# Patient Record
Sex: MALE | Race: WHITE | Employment: FULL TIME | URBAN - METROPOLITAN AREA
[De-identification: names, ages, dates, MRNs, and addresses within clinical notes are randomized per-mention and may not be internally consistent; named-entity substitution may affect disease eponyms.]

---

## 2023-11-08 ENCOUNTER — HOSPITAL ENCOUNTER (OUTPATIENT)
Dept: GENERAL RADIOLOGY | Age: 43
Discharge: HOME | End: 2023-11-08
Attending: PHYSICIAN ASSISTANT
Payer: COMMERCIAL

## 2023-11-08 DIAGNOSIS — M47.22 CERVICAL SPONDYLOSIS WITH RADICULOPATHY: ICD-10-CM

## 2023-11-08 PROCEDURE — 72050 X-RAY EXAM NECK SPINE 4/5VWS: CPT | Performed by: PHYSICIAN ASSISTANT

## 2023-11-13 ENCOUNTER — TELEPHONE (OUTPATIENT)
Dept: SURGERY | Facility: CLINIC | Age: 43
End: 2023-11-13

## 2023-11-13 ENCOUNTER — OFFICE VISIT (OUTPATIENT)
Dept: SURGERY | Facility: CLINIC | Age: 43
End: 2023-11-13
Payer: COMMERCIAL

## 2023-11-13 VITALS
BODY MASS INDEX: 25.73 KG/M2 | SYSTOLIC BLOOD PRESSURE: 120 MMHG | WEIGHT: 190 LBS | HEIGHT: 72 IN | DIASTOLIC BLOOD PRESSURE: 72 MMHG

## 2023-11-13 DIAGNOSIS — M54.2 NECK PAIN: Primary | ICD-10-CM

## 2023-11-13 DIAGNOSIS — R29.898 ARM WEAKNESS: ICD-10-CM

## 2023-11-13 DIAGNOSIS — M50.20 CERVICAL DISC HERNIATION: ICD-10-CM

## 2023-11-13 DIAGNOSIS — R20.0 NUMBNESS AND TINGLING OF LEFT HAND: ICD-10-CM

## 2023-11-13 DIAGNOSIS — M48.02 CERVICAL STENOSIS OF SPINAL CANAL: ICD-10-CM

## 2023-11-13 DIAGNOSIS — M54.12 CERVICAL RADICULOPATHY: ICD-10-CM

## 2023-11-13 DIAGNOSIS — M48.02 NEURAL FORAMINAL STENOSIS OF CERVICAL SPINE: ICD-10-CM

## 2023-11-13 DIAGNOSIS — R20.2 NUMBNESS AND TINGLING OF LEFT HAND: ICD-10-CM

## 2023-11-13 DIAGNOSIS — M50.30 DDD (DEGENERATIVE DISC DISEASE), CERVICAL: ICD-10-CM

## 2023-11-13 DIAGNOSIS — M47.812 FACET ARTHROPATHY, CERVICAL: ICD-10-CM

## 2023-11-13 NOTE — PROGRESS NOTES
Pt here for neck pain that travels down to both arms- more on the right. Pt states he has numbness in right hand and tingling in left arm. Pt did do PT- but did not help. No injections. No neck or back surgery.  Pt [facility].

## 2023-11-13 NOTE — TELEPHONE ENCOUNTER
Pt brought in X-ray Thoracis, X-ray Shoulder and X-ray cervical from [facility] dated 10/18/23 and MRI Cervical Spine from [facility] Dated 11/1/23. Disc was uploaded and returned to pt at time of visit. No imaging reports.

## 2023-11-13 NOTE — TELEPHONE ENCOUNTER
Patient is scheduled for ANTERIOR CERVICAL 5 TO 6, CERVICAL 6 TO 7 DISCECTOMY AND FUSION  on 1-8-24 with Dr Bo Caba at [facility].    Y pre-op apt scheduled (if sx is more than 30 days from last apt)  Ypre-op apt scheduled with RN spine navigator  Y Surgical instructions reviewed by nursing staff with patient  Y  form completed  Y Surgery order signed   Phoebe Lowe on surgery sheet  Y Placed on outlook calendar  Y [employer] message sent to patient with sx instructions  Y Faxed pre-op clearance request to PCP Dr Mary Jo Mckoy letter to prescribing provider requesting anticoagulants be held for surgery  N/a E-mail sent to [employer]  Y Post-op appointments made  Y PA [employer]. Routed to PA team to initiate. Y Post-Op outreach pt reminder placed.    Y Entire Neurosurgery Checklist Completed

## 2023-11-13 NOTE — PATIENT INSTRUCTIONS
You are scheduled for anterior cervical discectomy and fusion on 1-8-24 with Dr. Smita Baez at [facility] AND [facility].    Pre-op clearance from your primary care provider is needed within 30 days of surgery. We have faxed a clearance request to   's office. Please contact their office for appointment. Please schedule this appointment AT LEAST 1 WEEK PRIOR TO SURGERY DATE. Your PCP may order additional testing or clearance from another specialist.     Precious Sigala will have an appointment with our RN Spine Navigator prior to surgery. This is an educational telehealth/phone visit in which you will receive more information on the specifics of your surgery, instructions for before surgery, what to expect in the hospital and how to take care of yourself after surgery. Your surgeon wants you to to participate in this visit so that you are as prepared for surgery as possible and have an opportunity to ask questions. If possible, we would like your care partner to be present for the visit as well. You will need to contact the Pre-admission department at [phone] to schedule your pre-op testing. They will get you scheduled for blood work and a MRSA test (nasal swab). You will need for fast for the blood work. You may also need an EKG and/or chest x-ray depending on your current health status. If they do not answer when you call, please leave a message and they will call you back. They return calls in order of the date of surgery so it may be a few days before they return your call. You should have nothing to eat or drink after 12:00 am the night prior to surgery EXCEPT GATORADE:  You will need to drink 12 ounces (small bottle) of regular Gatorade (NOT RED) 12 hours and 4 hours prior to your scheduled surgery time. Do not drink any other liquids (including water) before your surgery. Do not chew gum or eat candies before surgery.   Take 1000 mg of Tylenol (Acetaminophen) 4 hours before your scheduled surgery time, take this with your scheduled Gatorade. You should take your daily medications with the 4 hour Gatorade, unless instructed otherwise. Surgery is usually scheduled as 1-2 day admission. This is an estimate and varies from person to person. Ultimately, the surgeon will determine when you are ready to be discharged. Our office will contact your insurance for authorization of surgery. The hospital will contact you 1-2 days before surgery with your expected arrival time and day-of instructions. To prevent infection post-operatively, you will need to shower with Hibiclens soap for 5 days prior to surgery. The last shower should be the night before surgery. Hibiclens soap can be found at any pharmacy in the first-aid section. See detailed instructions at the end of this message. FMLA/DISABILITY PAPERWORK  If you require FMLA or disability paperwork for your recovery, please make sure to either drop it off or have it faxed to our office at . Be sure the form has your name and date of birth on it. The form will be faxed to our Forms Department and they will complete it for you. There is a 25$ fee for all forms that need to be filled out. Please be aware there is a 10-14 day turnaround time. You will need to sign a release of information (KASHIF) form if your paperwork does not come with one. You may call the Forms Department with any questions at [phone]. Their fax number is [phone]. Hibiclens Bathing  Hibiclens is a body soap that is used before surgery protect you from getting an infection after surgery   Hibiclens can be found in most pharmacies in the [address] with this daily for FIVE consecutive days before surgery, using the entire bottle over the five days. The last shower should be the night before surgery.    Steps to bathing with Hibiclens  Do not use Hibiclens on your hair, face or private areas  Wash your hair and face as normal with your usual cleansers  Rinse well  Using a clean wet washcloth apply enough Hibiclens to cover your body. Wash from the neck down avoiding the genital areas and concentrating on the surgical area  Rinse well  Dry yourself with a clean, dry towel  Do not use any powders, creams, lotions or sprays on your body as these attract bacteria  Deodorant and facial creams are acceptable. (Laundering/cleaning: Chlorhexidine gluconate skin cleansers will cause stains if used with chlorine releasing products. Rinse completely and use only non-chlorine detergents.)    POST OP CARE--First Two Weeks  No bending at waist, twisting, pushing or pulling  No lifting more than 10 lb (a gallon of milk)  Wear cervical collar/lumbar brace, if prescribed, as instructed by surgeon  No overhead reaching  For anterior cervical surgeries, begin with eating soft foods and thick liquids for the first few days. It is normal to have some discomfort when swallowing. Return to normal diet as tolerated. No driving until approved by your surgeon   Change positions frequently. No prolonged sitting or standing. Increase walking as tolerated to avoid blood clots  No NSAIDs until approved by surgeon (ibuprofen, aleve, advil, meloxicam, Celebrex, diclofenac etc). Remove any bandage on post op day 3. Leave incision open to air. Glue will fall off on its own. If you have staples or sutures that are not dissolvable, these will be removed at your 2 week post op visit. Check your incision for signs of infection daily (redness, warmth, drainage, increased pain at incision site, fever)  Do not shower until post op day 3. Do not allow water pressure to directly hit the incision. Do not scrub the incision and avoid any lotion, creams or perfume near the incision site. No swimming, hot tubs or baths until your incision is completely healed  Take pain medication as prescribed, if needed. Constipation is a common side effect of opioids.   If you experience constipation, drink plenty of water and take over-the-counter stool softeners daily.    Avoid nicotine and alcohol   When to call the office:  Increased or change in location of pain  Increased weakness in arms or legs  Incision drainage, redness or warmth  Fever  Bowel or bladder changes   Choking on food or liquids (cervical)  Pain, redness, swelling, color changes or warmth in lower leg

## 2023-11-13 NOTE — TELEPHONE ENCOUNTER
You are scheduled for ANTERIOR CERVICAL 5 TO 6, CERVICAL 6 TO 7 DISCECTOMY AND FUSION  on 1-8-24 with Dr. Ivan Rushing at [facility].    Pre-op clearance from your primary care provider is needed within 30 days of surgery. We have faxed a clearance request to Dr. Jenny Cross 's office. Please contact their office for appointment. Please schedule this appointment AT LEAST 1 WEEK PRIOR TO SURGERY DATE. Your PCP may order additional testing or clearance from another specialist.     Vale Thomas will have an appointment with our RN Spine Navigator prior to surgery. This is an educational telehealth/phone visit in which you will receive more information on the specifics of your surgery, instructions for before surgery, what to expect in the hospital and how to take care of yourself after surgery. Your surgeon wants you to to participate in this visit so that you are as prepared for surgery as possible and have an opportunity to ask questions. If possible, we would like your care partner to be present for the visit as well. You will need to contact the Pre-admission department at [phone] to schedule your pre-op testing. They will get you scheduled for blood work and a MRSA test (nasal swab). You will need for fast for the blood work. You may also need an EKG and/or chest x-ray depending on your current health status. If they do not answer when you call, please leave a message and they will call you back. They return calls in order of the date of surgery so it may be a few days before they return your call. You should have nothing to eat or drink after 12:00 am the night prior to surgery EXCEPT GATORADE:  You will need to drink 12 ounces (small bottle) of regular Gatorade (NOT RED) 12 hours and 4 hours prior to your scheduled surgery time. Do not drink any other liquids (including water) before your surgery. Do not chew gum or eat candies before surgery.   Take 1000 mg of Tylenol (Acetaminophen) 4 hours before your scheduled surgery time, take this with your scheduled Gatorade. You should take your daily medications with the 4 hour Gatorade, unless instructed otherwise. Surgery is usually scheduled as 1-2 day admission. This is an estimate and varies from person to person. Ultimately, the surgeon will determine when you are ready to be discharged. Our office will contact your insurance for authorization of surgery. The hospital will contact you 1-2 days before surgery with your expected arrival time and day-of instructions. To prevent infection post-operatively, you will need to shower with Hibiclens soap for 5 days prior to surgery. The last shower should be the night before surgery. Hibiclens soap can be found at any pharmacy in the first-aid section. See detailed instructions at the end of this message. FMLA/DISABILITY PAPERWORK  If you require FMLA or disability paperwork for your recovery, please make sure to either drop it off or have it faxed to our office at . Be sure the form has your name and date of birth on it. The form will be faxed to our Forms Department and they will complete it for you. There is a 25$ fee for all forms that need to be filled out. Please be aware there is a 10-14 day turnaround time. You will need to sign a release of information (KASHIF) form if your paperwork does not come with one. You may call the Forms Department with any questions at [phone]. Their fax number is [phone]. Hibiclens Bathing  Hibiclens is a body soap that is used before surgery protect you from getting an infection after surgery   Hibiclens can be found in most pharmacies in the [address] with this daily for FIVE consecutive days before surgery, using the entire bottle over the five days. The last shower should be the night before surgery.    Steps to bathing with Hibiclens  Do not use Hibiclens on your hair, face or private areas  Wash your hair and face as normal with your usual cleansers  Rinse well  Using a clean wet washcloth apply enough Hibiclens to cover your body. Wash from the neck down avoiding the genital areas and concentrating on the surgical area  Rinse well  Dry yourself with a clean, dry towel  Do not use any powders, creams, lotions or sprays on your body as these attract bacteria  Deodorant and facial creams are acceptable. (Laundering/cleaning: Chlorhexidine gluconate skin cleansers will cause stains if used with chlorine releasing products. Rinse completely and use only non-chlorine detergents.)    POST OP CARE--First Two Weeks  No bending at waist, twisting, pushing or pulling  No lifting more than 10 lb (a gallon of milk)  Wear cervical collar/lumbar brace, if prescribed, as instructed by surgeon  No overhead reaching  For anterior cervical surgeries, begin with eating soft foods and thick liquids for the first few days. It is normal to have some discomfort when swallowing. Return to normal diet as tolerated. No driving until approved by your surgeon   Change positions frequently. No prolonged sitting or standing. Increase walking as tolerated to avoid blood clots  No NSAIDs until approved by surgeon (ibuprofen, aleve, advil, meloxicam, Celebrex, diclofenac etc). Remove any bandage on post op day 3. Leave incision open to air. Glue will fall off on its own. If you have staples or sutures that are not dissolvable, these will be removed at your 2 week post op visit. Check your incision for signs of infection daily (redness, warmth, drainage, increased pain at incision site, fever)  Do not shower until post op day 3. Do not allow water pressure to directly hit the incision. Do not scrub the incision and avoid any lotion, creams or perfume near the incision site. No swimming, hot tubs or baths until your incision is completely healed  Take pain medication as prescribed, if needed.   Constipation is a common side effect of opioids. If you experience constipation, drink plenty of water and take over-the-counter stool softeners daily.    Avoid nicotine and alcohol   When to call the office:  Increased or change in location of pain  Increased weakness in arms or legs  Incision drainage, redness or warmth  Fever  Bowel or bladder changes   Choking on food or liquids (cervical)  Pain, redness, swelling, color changes or warmth in lower leg    CLEARANCES-PCP  PA-[employer]BS PPO  CPT [id number], [place], [address], [id number], [facility], [id number]

## 2023-11-13 NOTE — PROGRESS NOTES
New Neurosurgery Patient    Patient: Tangela Rao  Medical Record Number: [id number]  YOB: 1980  PCP: No primary care provider on file. Referring Provider: No ref. provider found. Reason for visit: neck and arm pain     HISTORY OF PRESENTING ILLNESS:  I had the pleasure of meeting Tangela Rao in the neurosurgery clinic today. Tangela Rao is a pleasant 44year old male who presents today with complaints of neck and bilateral upper extremity symptoms. He reports that he began participating in [employer] at the beginning of this year. He states that he went on vacation shortly after starting and subsequently developed left upper extremity pain. He denies any particular traumas or inciting events, as the pain was not acute in onset. In July 2023, he began to notice left upper extremity weakness. Today, he endorses neck pain that radiates into his right trapezius muscle and extends into his posterolateral arm and proximal forearm. The pain is constant in nature and can reach a 10/10 in severity at its worst.  When manageable, it can be a 2/10, but is still bothersome to the patient. He feels a \"pulling sensation\" in his neck/trapezius muscle on the right. His neck pain is equal in severity to his upper extremity pain. He has tried OTC pain control, heat, physical therapy, and marijuana use without good relief. He has not had cervical injections. He describes associated numbness/tingling along this distribution in the right upper extremity. He also endorses right upper extremity weakness. He denies any pain in his left upper extremity, but does notice numbness/tingling in his first through third digits. His right hand is spared. He does not have any difficulty with manual dexterity or dropping objects. He denies imbalance or urinary urgency. The patient is otherwise healthy. He works as a  for the [employer], which requires rigorous activity.      No past medical history on file. No past surgical history on file. No family history on file. Social History     Socioeconomic History    Marital status:       Not on File   Current Medications:  No current outpatient medications on file. REVIEW OF SYSTEMS:  Comprehensive review of systems completed and negative with the exception of aforementioned information in the HPI. PHYSICAL EXAM:    11/13/2023 9:44 AM    /72   Weight 190 lb (86.2 kg)   Height 72\"   Pain Score 4 - (Moderate)   Pain Loc Neck    Other Vitals   BMI 25.77 kg/m2   BSA 2.08 m2   Tobacco   Smoking status Never   Smokeless status Never   Reviewed 11/13/2023          General: Well developed, well nourished, in no acute distress. Ambulates without assistance. HEENT: Normocephalic, atraumatic. Respirations: Non-labored     Neurologic / Musculoskeletal: Awake, alert, and interactive. Recent and remote memory appear intact. Attention span and concentration are appropriate. No dysarthria. Coordination and motor control grossly intact. Patient follows commands briskly and appropriately. Face symmetric. Cervical Spine:    Motor/ Extremities   Deltoid Biceps Triceps  Hand intrinsics   Sensation   R 5/5 5/5 4+/5 5/5 5/5 Intact to light touch   L 5/5 5/5 5/5 5/5 5/5 Intact to light touch       Lumbar Spine:    Motor / Extremities   Hip   flexion Knee   extension Dorsiflexion EHL Plantarflexion   Sensation   R 5/5 5/5 5/5 5/5 5/5 Intact to light touch   L 5/5 5/5 5/5 5/5 5/5 Intact to light touch     Reflexes:  -Biceps: 2+ and equal bilaterally  -Triceps: 1+ and equal bilaterally  -Matta's Sign: Negative  -Patellar: 2+ and equal bilaterally  -Ankle: 2+ and equal bilaterally  -Clonus: Negative    IMAGING:  MRI cervical spine without contrast 11/1/2023      No report available. Independently reviewed imaging.      XR CERVICAL SPINE AP LAT FLEX EXT EM (CPT=72050)    Result Date: 11/8/2023  CONCLUSION:  1. Multilevel degenerative changes of cervical spine, particularly at C5-C6. 2. No evidence of instability on dynamic views. 3. No radiographically visible acute osseous injury of the cervical spine. Dictated by (CST): Laal Foster MD on 11/08/2023 at 3:46 PM     Finalized by (CST): Lala Foster MD on 11/08/2023 at 3:48 PM            Imaging reviewed. Images shown to patient and his wife in the room today in detail. .     ASSESSMENT / PLAN:    ICD-10-CM   1. Neck pain  M54.2      2. Cervical radiculopathy  M54.12      3. Numbness and tingling of left hand  R20.0    R20.2      4. Arm weakness  R29.898      5. Cervical stenosis of spinal canal  M48.02      6. Neural foraminal stenosis of cervical spine  M48.02      7. DDD (degenerative disc disease), cervical  M50.30      8. Facet arthropathy, cervical  M47.812        Patient is a pleasant 44year old male who presents to the office today with complaints of neck pain that radiates into his right posterolateral arm and proximal forearm, with associated numbness/tingling, as well as numbness/tingling in his left first through third digit. Neurologic exam reveals right triceps weakness at 4+/5. No long track signs. Symptoms and exam findings are consistent with his pathology at C5-6 and C6-7. Dr. Bridger Lin also saw the patient today in consultation and discussed the recommended neurosurgical plan of care. Please see his note/attestation for further information.     -Medications Prescribed: None  -Imaging Ordered: None  -Referrals Placed: None  -Follow up: Tentatively scheduled for 2 level ACDF on 1/8/2024  -Work letter provided     Plan was reviewed and formed in collaboration with Dr. Bridger Lin. Plan was reviewed and discussed in detail with the patient. Patient encouraged to call the office with any questions or concerns of new/worsening neurologic symptoms. Patient demonstrated good understanding and was agreeable with the plan.      Visit time: 45 minutes   Over 50% of that time was spent providing patient education and discussing care plan.     Priya Terry PA-C  Physician Assistant- Neurosurgery   [address] [facility]  11/13/2023, 9:27 AM

## 2023-11-21 NOTE — TELEPHONE ENCOUNTER
Case change request sent cancelling surgery.       Pre/post ops cancelled  Removed from outlook and sx sheet  Updated MEDARDO irenet

## 2023-11-21 NOTE — TELEPHONE ENCOUNTER
Patient calling to cancel surgery states he has decided to go with a different doctor. Tried to transfer call to Fany Pierce no answer.

## 2024-01-02 ENCOUNTER — LAB ENCOUNTER (OUTPATIENT)
Dept: LAB | Facility: HOSPITAL | Age: 44
End: 2024-01-02
Attending: NEUROLOGICAL SURGERY
Payer: COMMERCIAL

## 2024-01-02 DIAGNOSIS — Z01.818 PREOP TESTING: ICD-10-CM

## 2024-01-02 LAB
ANTIBODY SCREEN: NEGATIVE
RH BLOOD TYPE: POSITIVE
RH BLOOD TYPE: POSITIVE

## 2024-01-02 PROCEDURE — 86901 BLOOD TYPING SEROLOGIC RH(D): CPT

## 2024-01-02 PROCEDURE — 86850 RBC ANTIBODY SCREEN: CPT

## 2024-01-02 PROCEDURE — 86900 BLOOD TYPING SEROLOGIC ABO: CPT

## 2024-01-02 PROCEDURE — 36415 COLL VENOUS BLD VENIPUNCTURE: CPT

## 2024-01-02 RX ORDER — DEXTROAMPHETAMINE SACCHARATE, AMPHETAMINE ASPARTATE MONOHYDRATE, DEXTROAMPHETAMINE SULFATE AND AMPHETAMINE SULFATE 2.5; 2.5; 2.5; 2.5 MG/1; MG/1; MG/1; MG/1
10 CAPSULE, EXTENDED RELEASE ORAL EVERY MORNING
COMMUNITY

## 2024-01-08 ENCOUNTER — ANESTHESIA EVENT (OUTPATIENT)
Dept: SURGERY | Facility: HOSPITAL | Age: 44
End: 2024-01-08
Payer: COMMERCIAL

## 2024-01-09 ENCOUNTER — HOSPITAL ENCOUNTER (OUTPATIENT)
Facility: HOSPITAL | Age: 44
Discharge: HOME | End: 2024-01-10
Attending: NEUROLOGICAL SURGERY | Admitting: NEUROLOGICAL SURGERY
Payer: COMMERCIAL

## 2024-01-09 ENCOUNTER — APPOINTMENT (OUTPATIENT)
Dept: GENERAL RADIOLOGY | Facility: HOSPITAL | Age: 44
End: 2024-01-09
Attending: NEUROLOGICAL SURGERY
Payer: COMMERCIAL

## 2024-01-09 ENCOUNTER — ANESTHESIA (OUTPATIENT)
Dept: SURGERY | Facility: HOSPITAL | Age: 44
End: 2024-01-09
Payer: COMMERCIAL

## 2024-01-09 DIAGNOSIS — Z01.818 PREOP TESTING: Primary | ICD-10-CM

## 2024-01-09 PROCEDURE — 76000 FLUOROSCOPY <1 HR PHYS/QHP: CPT | Performed by: NEUROLOGICAL SURGERY

## 2024-01-09 PROCEDURE — 0RR30JZ REPLACEMENT OF CERVICAL VERTEBRAL DISC WITH SYNTHETIC SUBSTITUTE, OPEN APPROACH: ICD-10-PCS | Performed by: NEUROLOGICAL SURGERY

## 2024-01-09 DEVICE — SIMPLIFY DISC SIZE 3, HT 5
Type: IMPLANTABLE DEVICE | Status: FUNCTIONAL
Brand: SIMPLIFY

## 2024-01-09 RX ORDER — ACETAMINOPHEN 500 MG
1000 TABLET ORAL EVERY 8 HOURS SCHEDULED
Status: DISCONTINUED | OUTPATIENT
Start: 2024-01-09 | End: 2024-01-10

## 2024-01-09 RX ORDER — HYDROMORPHONE HYDROCHLORIDE 1 MG/ML
INJECTION, SOLUTION INTRAMUSCULAR; INTRAVENOUS; SUBCUTANEOUS AS NEEDED
Status: DISCONTINUED | OUTPATIENT
Start: 2024-01-09 | End: 2024-01-09 | Stop reason: SURG

## 2024-01-09 RX ORDER — OXYCODONE HYDROCHLORIDE 5 MG/1
5 TABLET ORAL EVERY 4 HOURS PRN
Status: DISCONTINUED | OUTPATIENT
Start: 2024-01-09 | End: 2024-01-10

## 2024-01-09 RX ORDER — POLYETHYLENE GLYCOL 3350 17 G/17G
17 POWDER, FOR SOLUTION ORAL DAILY PRN
Status: DISCONTINUED | OUTPATIENT
Start: 2024-01-09 | End: 2024-01-10

## 2024-01-09 RX ORDER — MEPERIDINE HYDROCHLORIDE 25 MG/ML
12.5 INJECTION INTRAMUSCULAR; INTRAVENOUS; SUBCUTANEOUS AS NEEDED
Status: DISCONTINUED | OUTPATIENT
Start: 2024-01-09 | End: 2024-01-09 | Stop reason: HOSPADM

## 2024-01-09 RX ORDER — ONDANSETRON 2 MG/ML
4 INJECTION INTRAMUSCULAR; INTRAVENOUS EVERY 6 HOURS PRN
Status: DISCONTINUED | OUTPATIENT
Start: 2024-01-09 | End: 2024-01-09 | Stop reason: HOSPADM

## 2024-01-09 RX ORDER — SODIUM CHLORIDE, SODIUM LACTATE, POTASSIUM CHLORIDE, CALCIUM CHLORIDE 600; 310; 30; 20 MG/100ML; MG/100ML; MG/100ML; MG/100ML
INJECTION, SOLUTION INTRAVENOUS CONTINUOUS
Status: DISCONTINUED | OUTPATIENT
Start: 2024-01-09 | End: 2024-01-09 | Stop reason: HOSPADM

## 2024-01-09 RX ORDER — DEXAMETHASONE SODIUM PHOSPHATE 4 MG/ML
VIAL (ML) INJECTION AS NEEDED
Status: DISCONTINUED | OUTPATIENT
Start: 2024-01-09 | End: 2024-01-09 | Stop reason: SURG

## 2024-01-09 RX ORDER — ENEMA 19; 7 G/133ML; G/133ML
1 ENEMA RECTAL ONCE AS NEEDED
Status: DISCONTINUED | OUTPATIENT
Start: 2024-01-09 | End: 2024-01-10

## 2024-01-09 RX ORDER — DIAZEPAM 5 MG/1
5 TABLET ORAL 3 TIMES DAILY
Status: DISCONTINUED | OUTPATIENT
Start: 2024-01-09 | End: 2024-01-10

## 2024-01-09 RX ORDER — CEFAZOLIN SODIUM/WATER 2 G/20 ML
2 SYRINGE (ML) INTRAVENOUS EVERY 8 HOURS
Qty: 40 ML | Refills: 0 | Status: COMPLETED | OUTPATIENT
Start: 2024-01-09 | End: 2024-01-10

## 2024-01-09 RX ORDER — HYDROMORPHONE HYDROCHLORIDE 1 MG/ML
0.4 INJECTION, SOLUTION INTRAMUSCULAR; INTRAVENOUS; SUBCUTANEOUS EVERY 5 MIN PRN
Status: DISCONTINUED | OUTPATIENT
Start: 2024-01-09 | End: 2024-01-09 | Stop reason: HOSPADM

## 2024-01-09 RX ORDER — SENNOSIDES 8.6 MG
17.2 TABLET ORAL NIGHTLY
Status: DISCONTINUED | OUTPATIENT
Start: 2024-01-09 | End: 2024-01-10

## 2024-01-09 RX ORDER — NAPROXEN 500 MG/1
500 TABLET ORAL 2 TIMES DAILY WITH MEALS
Status: DISCONTINUED | OUTPATIENT
Start: 2024-01-09 | End: 2024-01-10

## 2024-01-09 RX ORDER — PROCHLORPERAZINE EDISYLATE 5 MG/ML
5 INJECTION INTRAMUSCULAR; INTRAVENOUS EVERY 8 HOURS PRN
Status: DISCONTINUED | OUTPATIENT
Start: 2024-01-09 | End: 2024-01-10

## 2024-01-09 RX ORDER — HYDROMORPHONE HYDROCHLORIDE 1 MG/ML
0.4 INJECTION, SOLUTION INTRAMUSCULAR; INTRAVENOUS; SUBCUTANEOUS EVERY 2 HOUR PRN
Status: DISCONTINUED | OUTPATIENT
Start: 2024-01-09 | End: 2024-01-10

## 2024-01-09 RX ORDER — HYDROMORPHONE HYDROCHLORIDE 1 MG/ML
0.6 INJECTION, SOLUTION INTRAMUSCULAR; INTRAVENOUS; SUBCUTANEOUS EVERY 5 MIN PRN
Status: DISCONTINUED | OUTPATIENT
Start: 2024-01-09 | End: 2024-01-09 | Stop reason: HOSPADM

## 2024-01-09 RX ORDER — MIDAZOLAM HYDROCHLORIDE 1 MG/ML
INJECTION INTRAMUSCULAR; INTRAVENOUS AS NEEDED
Status: DISCONTINUED | OUTPATIENT
Start: 2024-01-09 | End: 2024-01-09 | Stop reason: SURG

## 2024-01-09 RX ORDER — ACETAMINOPHEN 500 MG
1000 TABLET ORAL ONCE
Status: COMPLETED | OUTPATIENT
Start: 2024-01-09 | End: 2024-01-09

## 2024-01-09 RX ORDER — PROCHLORPERAZINE EDISYLATE 5 MG/ML
5 INJECTION INTRAMUSCULAR; INTRAVENOUS EVERY 8 HOURS PRN
Status: DISCONTINUED | OUTPATIENT
Start: 2024-01-09 | End: 2024-01-09 | Stop reason: HOSPADM

## 2024-01-09 RX ORDER — CEFAZOLIN SODIUM/WATER 2 G/20 ML
2 SYRINGE (ML) INTRAVENOUS ONCE
Status: COMPLETED | OUTPATIENT
Start: 2024-01-09 | End: 2024-01-09

## 2024-01-09 RX ORDER — HYDROMORPHONE HYDROCHLORIDE 1 MG/ML
0.8 INJECTION, SOLUTION INTRAMUSCULAR; INTRAVENOUS; SUBCUTANEOUS EVERY 2 HOUR PRN
Status: DISCONTINUED | OUTPATIENT
Start: 2024-01-09 | End: 2024-01-10

## 2024-01-09 RX ORDER — HYDROMORPHONE HYDROCHLORIDE 1 MG/ML
0.2 INJECTION, SOLUTION INTRAMUSCULAR; INTRAVENOUS; SUBCUTANEOUS EVERY 5 MIN PRN
Status: DISCONTINUED | OUTPATIENT
Start: 2024-01-09 | End: 2024-01-09 | Stop reason: HOSPADM

## 2024-01-09 RX ORDER — LIDOCAINE HYDROCHLORIDE 10 MG/ML
INJECTION, SOLUTION EPIDURAL; INFILTRATION; INTRACAUDAL; PERINEURAL AS NEEDED
Status: DISCONTINUED | OUTPATIENT
Start: 2024-01-09 | End: 2024-01-09 | Stop reason: SURG

## 2024-01-09 RX ORDER — DIPHENHYDRAMINE HCL 25 MG
25 CAPSULE ORAL EVERY 4 HOURS PRN
Status: DISCONTINUED | OUTPATIENT
Start: 2024-01-09 | End: 2024-01-10

## 2024-01-09 RX ORDER — NALOXONE HYDROCHLORIDE 0.4 MG/ML
80 INJECTION, SOLUTION INTRAMUSCULAR; INTRAVENOUS; SUBCUTANEOUS AS NEEDED
Status: DISCONTINUED | OUTPATIENT
Start: 2024-01-09 | End: 2024-01-09 | Stop reason: HOSPADM

## 2024-01-09 RX ORDER — DIPHENHYDRAMINE HYDROCHLORIDE 50 MG/ML
25 INJECTION INTRAMUSCULAR; INTRAVENOUS EVERY 4 HOURS PRN
Status: DISCONTINUED | OUTPATIENT
Start: 2024-01-09 | End: 2024-01-10

## 2024-01-09 RX ORDER — MORPHINE SULFATE 4 MG/ML
2 INJECTION, SOLUTION INTRAMUSCULAR; INTRAVENOUS EVERY 10 MIN PRN
Status: DISCONTINUED | OUTPATIENT
Start: 2024-01-09 | End: 2024-01-09 | Stop reason: HOSPADM

## 2024-01-09 RX ORDER — MORPHINE SULFATE 10 MG/ML
6 INJECTION, SOLUTION INTRAMUSCULAR; INTRAVENOUS EVERY 10 MIN PRN
Status: DISCONTINUED | OUTPATIENT
Start: 2024-01-09 | End: 2024-01-09 | Stop reason: HOSPADM

## 2024-01-09 RX ORDER — ONDANSETRON 2 MG/ML
4 INJECTION INTRAMUSCULAR; INTRAVENOUS EVERY 6 HOURS PRN
Status: DISCONTINUED | OUTPATIENT
Start: 2024-01-09 | End: 2024-01-10

## 2024-01-09 RX ORDER — SODIUM CHLORIDE 9 MG/ML
INJECTION, SOLUTION INTRAVENOUS CONTINUOUS PRN
Status: DISCONTINUED | OUTPATIENT
Start: 2024-01-09 | End: 2024-01-09 | Stop reason: SURG

## 2024-01-09 RX ORDER — MORPHINE SULFATE 4 MG/ML
4 INJECTION, SOLUTION INTRAMUSCULAR; INTRAVENOUS EVERY 10 MIN PRN
Status: DISCONTINUED | OUTPATIENT
Start: 2024-01-09 | End: 2024-01-09 | Stop reason: HOSPADM

## 2024-01-09 RX ORDER — OXYCODONE HYDROCHLORIDE 5 MG/1
10 TABLET ORAL EVERY 4 HOURS PRN
Status: DISCONTINUED | OUTPATIENT
Start: 2024-01-09 | End: 2024-01-10

## 2024-01-09 RX ORDER — DOCUSATE SODIUM 100 MG/1
100 CAPSULE, LIQUID FILLED ORAL 2 TIMES DAILY
Status: DISCONTINUED | OUTPATIENT
Start: 2024-01-09 | End: 2024-01-10

## 2024-01-09 RX ORDER — BISACODYL 10 MG
10 SUPPOSITORY, RECTAL RECTAL
Status: DISCONTINUED | OUTPATIENT
Start: 2024-01-09 | End: 2024-01-10

## 2024-01-09 RX ORDER — ONDANSETRON 2 MG/ML
INJECTION INTRAMUSCULAR; INTRAVENOUS AS NEEDED
Status: DISCONTINUED | OUTPATIENT
Start: 2024-01-09 | End: 2024-01-09 | Stop reason: SURG

## 2024-01-09 RX ORDER — SODIUM CHLORIDE, SODIUM LACTATE, POTASSIUM CHLORIDE, CALCIUM CHLORIDE 600; 310; 30; 20 MG/100ML; MG/100ML; MG/100ML; MG/100ML
INJECTION, SOLUTION INTRAVENOUS CONTINUOUS
Status: DISCONTINUED | OUTPATIENT
Start: 2024-01-09 | End: 2024-01-10

## 2024-01-09 RX ADMIN — SODIUM CHLORIDE, SODIUM LACTATE, POTASSIUM CHLORIDE, CALCIUM CHLORIDE: 600; 310; 30; 20 INJECTION, SOLUTION INTRAVENOUS at 17:57:00

## 2024-01-09 RX ADMIN — SODIUM CHLORIDE: 9 INJECTION, SOLUTION INTRAVENOUS at 14:00:00

## 2024-01-09 RX ADMIN — ONDANSETRON 4 MG: 2 INJECTION INTRAMUSCULAR; INTRAVENOUS at 14:15:00

## 2024-01-09 RX ADMIN — MIDAZOLAM HYDROCHLORIDE 2 MG: 1 INJECTION INTRAMUSCULAR; INTRAVENOUS at 13:47:00

## 2024-01-09 RX ADMIN — DEXAMETHASONE SODIUM PHOSPHATE 8 MG: 4 MG/ML VIAL (ML) INJECTION at 14:15:00

## 2024-01-09 RX ADMIN — HYDROMORPHONE HYDROCHLORIDE 0.5 MG: 1 INJECTION, SOLUTION INTRAMUSCULAR; INTRAVENOUS; SUBCUTANEOUS at 17:16:00

## 2024-01-09 RX ADMIN — LIDOCAINE HYDROCHLORIDE 50 MG: 10 INJECTION, SOLUTION EPIDURAL; INFILTRATION; INTRACAUDAL; PERINEURAL at 13:55:00

## 2024-01-09 RX ADMIN — CEFAZOLIN SODIUM/WATER 2 G: 2 G/20 ML SYRINGE (ML) INTRAVENOUS at 14:00:00

## 2024-01-09 NOTE — ANESTHESIA PREPROCEDURE EVALUATION
Anesthesia PreOp Note    HPI:     Wally Alicia is a 43 year old male who presents for preoperative consultation requested by: Andrew Rodriguez MD    Date of Surgery: 1/9/2024    Procedure(s):  Anterior cervical decompression and disc arthroplasty at C5 - 6 and C6 - 7 with decompression of the spinal cord and nerves, plastic and metallic implants, possible fusion with cadaver bone graft and metallic cages, plate, and screws  Indication: Cervical spondylosis with radiculopathy    Relevant Problems   No relevant active problems       NPO:  Last Liquid Consumption Date: 01/08/24  Last Liquid Consumption Time: 2330  Last Solid Consumption Date: 01/08/24  Last Solid Consumption Time: 1830  Last Liquid Consumption Date: 01/08/24          History Review:  There are no problems to display for this patient.      Past Medical History:   Diagnosis Date    Attention deficit disorder     Back problem     Visual impairment        History reviewed. No pertinent surgical history.    Medications Prior to Admission   Medication Sig Dispense Refill Last Dose    amphetamine-dextroamphetamine ER 10 MG Oral Capsule SR 24 Hr Take 1 capsule (10 mg total) by mouth every morning.   Past Month     Current Facility-Administered Medications Ordered in Epic   Medication Dose Route Frequency Provider Last Rate Last Admin    lactated ringers infusion   Intravenous Continuous Andrew Rodriguez MD 20 mL/hr at 01/09/24 0950 New Bag at 01/09/24 0950    ceFAZolin (Ancef) 2 g in 20mL IV syringe premix  2 g Intravenous Once Andrew Rodriguez MD         No current [facility]-ordered outpatient medications on file.       No Known Allergies    Family History   Problem Relation Age of Onset    Lipids Mother     Psychiatric Mother         depression    Cancer Mother         breast     Social History     Socioeconomic History    Marital status:    Tobacco Use    Smoking status: Never    Smokeless tobacco: Never   Substance and Sexual Activity    Alcohol use:  Never    Drug use: Yes     Types: Cannabis     Comment: gummies and smoke daily       Available pre-op labs reviewed.             Vital Signs:  Body mass index is 26.72 kg/m².   height is 1.829 m (6') and weight is 89.4 kg (197 lb). His oral temperature is 98.6 °F (37 °C). His blood pressure is 131/72 and his pulse is 67. His respiration is 16 and oxygen saturation is 96%.   Vitals:    01/02/24 1324 01/09/24 0920   BP:  131/72   Pulse:  67   Resp:  16   Temp:  98.6 °F (37 °C)   TempSrc:  Oral   SpO2:  96%   Weight: 88.5 kg (195 lb) 89.4 kg (197 lb)   Height: 1.829 m (6')         Anesthesia Evaluation     Patient summary reviewed and Nursing notes reviewed    Airway   Mallampati: II  TM distance: >3 FB  Neck ROM: full  Dental      Pulmonary - normal exam    breath sounds clear to auscultation  Cardiovascular - normal exam    Rhythm: regular  Rate: normal    Neuro/Psych    (+)   attention deficit disorder      GI/Hepatic/Renal      Endo/Other    Abdominal                  Anesthesia Plan:   ASA:  2  Plan:   General  Airway:  ETT and Video laryngoscope  Post-op Pain Management: IV analgesics  Informed Consent Plan and Risks Discussed With:  Patient  Discussed plan with:  CRNA      I have informed Wally DANGELO Ravin and/or legal guardian or family member of the nature of the anesthetic plan, benefits, risks including possible dental damage if relevant, major complications, and any alternative forms of anesthetic management.   All of the patient's questions were answered to the best of my ability. The patient desires the anesthetic management as planned.  RADHA LAUGHLIN MD  1/9/2024 10:38 AM  Present on Admission:  **None**

## 2024-01-09 NOTE — ANESTHESIA PROCEDURE NOTES
Peripheral IV  Date/Time: 1/9/2024 2:00 PM  Inserted by: Cherise Watkins CRNA    Placement  Needle size: 18 G  Laterality: left  Location: hand  Local anesthetic: none  Site prep: alcohol  Technique: anatomical landmarks  Attempts: 1

## 2024-01-09 NOTE — OPERATIVE REPORT
OPERATIVE REPORT      PATIENT NAME:  Wally Alicia     DATE OF OPERATION:  1/9/2024       PREOPERATIVE DIAGNOSIS: Cervical  Cervical disc disorder C5-6 with radiculopathy.  Cervical disc disorder C6-7 with radiculopathy.  Cervical spondylosis with radiculopathy.    POSTOPERATIVE DIAGNOSIS:    Cervical disc disorder C5-6 with radiculopathy.  Cervical disc disorder C6-7 with radiculopathy.  Cervical spondylosis with radiculopathy.      OPERATIVE PROCEDURE:  Anterior cervical discectomy C5-6 with decompression of the spinal cord and nerve roots.  Anterior cervical discectomy C6-7 with decompression of the spinal cord and nerve roots.  Anterior cervical disc replacement C5-6 with Simplify disc arthroplasty (18 x 16 x 5 mm).  Anterior cervical disc replacement C6-7 with Simplify disc arthroplasty (18 x 16 x 5 mm).  Real time intraoperative fluoroscopy and C-arm with image guidance and surgeon interpretation.  Real time intraoperative motor-evoked potentials, SSEP and nerve monitoring (ICD 4S1352A).      SURGEON:  Andrew Rodriguez M.D.    ASSISTANT:  Alyssa Kohli P.A.-C.    NEUROMONITORING: Lars      PROCEDURE:  After informed consent was obtained, the patient was brought to the operating room and placed on the operating room table in the supine position.  After the uneventful induction of general endotracheal anesthesia, electrodes and monitoring devices were placed.  The shoulders were supported and the neck was kept midline in a neutral position.  The neck was then prepped with alcohol followed by chlorhexidine gluconate solution and draped with sterile linens and a clear plastic drape.  The C-arm was also draped sterilely into the field.  Baseline motor evoked potentials were run.  The C-arm was then used to localize the C6 level.  A timeout was taken.  A skin crease incision was made in the anterior triangle of the neck.  The subcutaneous tissues were opened sharply and  dissected off the platysma.  The platysma was then opened parallel to its fibers.  Using both sharp and blunt dissection, the trachea and esophagus were dissected medially and the carotid sheath structures dissected laterally to arrive at the prevertebral space.  The C5-6 disc space was marked and this was confirmed with fluoroscopy images.  The longus colli muscles were then dissected off the C5, C6 and C7 vertebrae and self-retaining retractors were placed into the trough created by the dissection and the cuff on the endotracheal tube was deflated by anesthesia. Another series of motor-evoked potentials was run and remained stable.    The annulus at C5-6 was opened with a #15 bladed knife and disc as well as cartilaginous endplate were removed using series of curettes and Liudmila rongeurs down to the posterior annulus.  A similar procedure was then undertaken at C6-7. Distraction pins were placed first into C6 and C7, and gentle distraction was applied. The microscope was then brought into the field, and another series of motor-evoked potentials was run.  This remained stable.    Under microscopic magnification and illumination, a high-speed drill with a fine cutting geovanny was used to remove the posterior vertebral osteophyte formation at C6-7 down to the posterior longitudinal ligament.  The posterior longitudinal ligament was opened and the dura was identified.  Herniated disc was encountered below the ligament centrally and to the left extending into the foramen, and this disc material was dissected into the operative field.  Disc, thickened ligament, and residual osteophyte were then removed with Kerrison rongeurs and micro-angled curettes both to the right and left extending to the foramina.  Foraminotomies were performed bilaterally.  The thecal sac and neural elements were completely decompressed.  A right-angle nerve root probe was used to confirm a decompression palpating superiorly, inferiorly, and to  the left and right.  Another series of motor-evoked potentials was stable.  The wound was copiously irrigated. The Simplify sizing trials were used and a 18 mm wide 5 mm high size was found to fit the disc space best.  Curettes were used to inspect the vertebral endplates and verify the cartilaginous endplates had been completely removed. The slot cutter was used to create the groove for the implant in the inferior aspect of C6 and superior aspect of C7.  A 18 mm wide by 16 mm deep and 5 mm high Simplify implant was brought onto the field and attached to the . Using fluoroscopic guidance, the Simplify implant was inserted into the disc space. Fluoroscopy was used to evaluate the depth of the implant, then the  was used to countersink the implant into the disc space to provide ideal position of the implant. The  was removed and fluoroscopic images confirmed good position of the implant in the disc space.    Attention was then directed to the C5-6 level, and the C7 distraction pin was moved into C5. Bone wax was placed into the distraction pin hole at C7. A similar procedure was then undertaken at the C5-6 level removing disc, thickened ligament and osteophyte, although at this level there was a large disc/osteophyte complex in the left lateral recess with thicker ligament and some osteophyte, and on the right there was a free fragment of disc in the right lateral recess and foramen causing significant compression on the right C6 nerve root. After the decompression was completed, the nerve root probe was also used to again palpate the neural elements going superiorly, inferiorly, left and right including out the foramina to confirm a decompression and the wound was copiously irrigated. Another set of motor evoked potentials was run and again found decreased latency in the hands so the tape on the deltoids was released.  The slot cutter was again used at the C5-6 level and another 18 mm x 16 mm  x 5 mm Simplify implant was again found to be the appropriate size at the C5-6 level after trials were used and evaluated under direct visualization and fluoroscopy. The endplate preparation was again confirmed, slots were cut and the Simplify implant was inserted at C5-6 under fluoroscopic guidance and again countersunk. Fluoroscopic images confirmed good position of this implant and the distraction pins in C5 and C6 were removed. These holes were also filled with bone wax. Final fluoroscopic images in both the lateral and AP projections documented good position of both implants. Another set of motor evoked potentials was run and remained stable.    The self-retaining retractors were removed and the wound was copiously irrigated.  Hand-held retractors were placed and small bleeding points were controlled with bipolar electrocautery.  More irrigation was used and hemostasis was confirmed.  A drain was placed in the prevertebral space and brought out through a separate stab incision in the skin.  The microscope was taken out of the field.  The platysma was then closed using a series of interrupted and running 4-0 Vicryl sutures.  A final set of motor-evoked potentials was run and again found to be stable.  The subcutaneous tissues were copiously irrigated and closed with an interrupted inverted 4-0 and 5-0 Vicryl suture.  The skin was closed with Dermabond.  The drain was covered with a Bio-patch and covered with a Tegaderm.      There were no complications.  All counts were reported correct.  The estimated blood loss was 50 mL.  Motor-evoked potentials and the free run EMG nerve monitoring remained stable during the entire procedure.  The patient was also stable following the procedure.

## 2024-01-09 NOTE — BRIEF OP NOTE
Pre-Operative Diagnosis: Cervical spondylosis with radiculopathy     Post-Operative Diagnosis: Cervical spondylosis with radiculopathy      Procedure Performed:   Anterior cervical decompression and disc arthroplasty at C5 - 6 and C6 - 7 with decompression of the spinal cord and nerves, plastic and metallic implants    Surgeon(s) and Role:     * Andrew Rodriguez MD - Primary    Assistant(s):  PA: Alyssa Kohli PA     Surgical Findings: See OP report     Specimen: None     Estimated Blood Loss: 50 mL    Dictation Number:  N/A     MEDARDO JOHNSON  1/9/2024  5:49 PM

## 2024-01-09 NOTE — ANESTHESIA PROCEDURE NOTES
Airway  Date/Time: 1/9/2024 1:56 PM  Urgency: Elective      General Information and Staff    Patient location during procedure: OR  Resident/CRNA: Cherise Watkins CRNA  Performed: CRNA   Performed by: Cherise Watkisn CRNA  Authorized by: Andrew Strickland MD      Indications and Patient Condition  Indications for airway management: anesthesia  Sedation level: deep  Preoxygenated: yes  Patient position: sniffing  Mask difficulty assessment: 0 - not attempted    Final Airway Details  Final airway type: endotracheal airway      Successful airway: ETT  Cuffed: yes   Successful intubation technique: Video laryngoscopy  Facilitating devices/methods: cricoid pressure and intubating stylet  Endotracheal tube insertion site: oral  Blade: GlideScope  Blade size: #3  ETT size (mm): 7.5    Cormack-Lehane Classification: grade I - full view of glottis  Placement verified by: capnometry   Measured from: teeth  ETT to teeth (cm): 22  Number of attempts at approach: 1

## 2024-01-09 NOTE — PROGRESS NOTES
1/9/2024     PRE-OPERATIVE CONSULTATION    Mr. Alicia was again informed of the nature of the problem, planned treatment, indications and alternatives.  We again reviewed the expected benefits of surgery, as well as all reasonably foreseeable risks, including but not limited to infection, bleeding requiring transfusion or reoperation, no relief or worsening of the pain, numbness, weakness, need for assistive devices to get around, injury to the spinal cord or nerves, paralysis, loss of control of the arms/legs/bladder/bowel/sexual function, spinal fluid leak, sore throat, hoarseness of voice, difficulty swallowing, hole in the esophagus, non-healing of the bones if we were to do a fusion surgery, break of the plate and screws also for fusion, failure of the disc replacement devices if we were to do the disc replacement requiring revision and a fusion, scar formation, heart attack, stroke, coma and death.  His questions were all answered and he understands what we discussed.  He also understands that no guarantees can be made regarding outcome or results.  He agrees the benefits of surgery outweigh the risks, and wishes to proceed with surgery.

## 2024-01-10 VITALS
BODY MASS INDEX: 26.68 KG/M2 | TEMPERATURE: 98 F | RESPIRATION RATE: 16 BRPM | HEIGHT: 72 IN | WEIGHT: 197 LBS | OXYGEN SATURATION: 96 % | SYSTOLIC BLOOD PRESSURE: 114 MMHG | HEART RATE: 78 BPM | DIASTOLIC BLOOD PRESSURE: 74 MMHG

## 2024-01-10 LAB
HCT VFR BLD AUTO: 39.9 %
HGB BLD-MCNC: 13.5 G/DL

## 2024-01-10 PROCEDURE — 85018 HEMOGLOBIN: CPT | Performed by: PHYSICIAN ASSISTANT

## 2024-01-10 PROCEDURE — 85014 HEMATOCRIT: CPT | Performed by: PHYSICIAN ASSISTANT

## 2024-01-10 RX ORDER — ACETAMINOPHEN 500 MG
1000 TABLET ORAL EVERY 6 HOURS PRN
Status: SHIPPED | COMMUNITY
Start: 2024-01-10

## 2024-01-10 RX ORDER — METHOCARBAMOL 750 MG/1
750 TABLET, FILM COATED ORAL 3 TIMES DAILY
Status: SHIPPED | COMMUNITY
Start: 2024-01-10

## 2024-01-10 RX ORDER — NAPROXEN 500 MG/1
500 TABLET ORAL 2 TIMES DAILY WITH MEALS
Status: SHIPPED | COMMUNITY
Start: 2024-01-10

## 2024-01-10 NOTE — PROGRESS NOTES
[facility]    Neurosurgery Progress Note    Wally Alicia Patient Status:  Outpatient in a Bed    1980 MRN [id number]   Location [facility] Attending Andrew Rodriguez MD   Hosp Day # 0 LOUIE MCCALL     Subjective:  Wally Alicia is a(n) 43 year old male postoperative day 1 status post C5-6, C6-7 arthroplasty.  Patient is doing very well this morning.  He has some discomfort in his posterior neck which is controlled well with pain medications.  He denies upper extremity shooting pain, numbness, paresthesias.  He has no difficulty with swallowing or hoarse voice.  He has been up and ambulating on his own without difficulty.  Alert, orientated x3.  Cooperative.  No apparent distress.    Vital Signs:    Temp:  [97.2 °F (36.2 °C)-98.7 °F (37.1 °C)] 97.9 °F (36.6 °C)  Pulse:  [67-88] 78  Resp:  [15-22] 16  BP: (112-149)/(72-89) 114/74  SpO2:  [94 %-96 %] 96 %    I/O:  I/O last 3 completed shifts:  In: 1220 [I.V.:1200; IV PIGGYBACK:20]  Out: 960 [Urine:900; Drains:60]    Inpatient Medications:    Current Facility-Administered Medications:     lactated ringers infusion, , Intravenous, Continuous    sodium chloride 0.9 % IV bolus 500 mL, 500 mL, Intravenous, Once PRN    sennosides (Senokot) tab 17.2 mg, 17.2 mg, Oral, Nightly    docusate sodium (Colace) cap 100 mg, 100 mg, Oral, BID    polyethylene glycol (PEG 3350) (Miralax) 17 g oral packet 17 g, 17 g, Oral, Daily PRN    magnesium hydroxide (Milk of Magnesia) 400 MG/5ML oral suspension 30 mL, 30 mL, Oral, Daily PRN    bisacodyl (Dulcolax) 10 MG rectal suppository 10 mg, 10 mg, Rectal, Daily PRN    fleet enema (Fleet) 7-19 GM/118ML rectal enema 133 mL, 1 enema, Rectal, Once PRN    ondansetron (Zofran) 4 MG/2ML injection 4 mg, 4 mg, Intravenous, Q6H PRN    prochlorperazine (Compazine) 10 MG/2ML injection 5 mg, 5 mg, Intravenous, Q8H PRN    diphenhydrAMINE (Benadryl) cap/tab 25 mg, 25 mg, Oral, Q4H PRN **OR** diphenhydrAMINE  (Benadryl) 50 mg/mL  injection 25 mg, 25 mg, Intravenous, Q4H PRN    benzocaine-menthol (Cepacol) lozenge 1 lozenge, 1 lozenge, Buccal, Q15 Min PRN    acetaminophen (Tylenol Extra Strength) tab 1,000 mg, 1,000 mg, Oral, Q8H LLOYD    naproxen (Naprosyn) tab 500 mg, 500 mg, Oral, BID with meals    oxyCODONE immediate release tab 5 mg, 5 mg, Oral, Q4H PRN **OR** oxyCODONE immediate release tab 10 mg, 10 mg, Oral, Q4H PRN    HYDROmorphone (Dilaudid) 1 MG/ML injection 0.4 mg, 0.4 mg, Intravenous, Q2H PRN **OR** HYDROmorphone (Dilaudid) 1 MG/ML injection 0.8 mg, 0.8 mg, Intravenous, Q2H PRN    diazePAM (Valium) tab 5 mg, 5 mg, Oral, TID    Labs:  Lab Results   Component Value Date    HGB 13.5 01/10/2024         Neurological Exam:  Strength:  5/5 in bilateral upper extremities    Sensation:  Intact in bilateral upper extremities throughout    Incision:  Clean/dry/intact. No erythema, discharge, warmth.     Abdomen:  Soft, non-distended, non-tender, with no rebound or guarding.  No peritoneal signs.   Extremities:  Non-tender, no lower extremity edema noted.        Imaging:  XR FLUOROSCOPY C-ARM TIME LESS THAN 1 HOUR (CPT=76000)    Result Date: 1/9/2024  CONCLUSION: Intraoperative fluoroscopy provided.  Please see surgical note for specific details.     Dictated by (CST): Dalton Ng MD on 1/09/2024 at 7:51 PM     Finalized by (CST): Dalton Ng MD on 1/09/2024 at 7:52 PM           Assessment/Plan:  Active Problems:    * No active hospital problems. *      Wally Alicia is a(n) 43 year old male postop day 1 status post C5-6, C6/7 arthroplasty.  Patient is doing well postop and his pain is well-controlled.  Pain medications were sent to the pharmacy.  We discussed postoperative limitations including lifting overhead or more than 10 pounds.  He will follow-up with Alyssa or Dr. Rubi in the office 2 weeks postoperatively.  He should call with any questions or concerns.    All questions and concerns were addressed.  We appreciate the opportunity to participate in the care of this patient. Please do not hesitate to call our office ([phone]) with any issues.    Mahesh Morales PA-C  1/10/2024  7:24 AM

## 2024-01-10 NOTE — DISCHARGE INSTRUCTIONS
May shower tonight, w/ no direct contact from water.  Pat dry after.  Tylenol 1000 mg Q6 for first few days then Q8 and finally AM & PM.  No BLT's- bending/lifting (over 10 lbs)/ twisting.  A gallon of milk is about 8lbs so let that be your guide.  Naproxen is prescribed as 2X/day @ meals.  For more even pain coverage you can take it with breakfast and then 12 hours later (but eat something with it to protect your stomach).  Walking is key to prevent blood clots and constipation.  You can also do ankle pumps to prevent clots and get something like miralax/metamucil to further prevent constipation.  Prunes are a nice natural alternative.

## 2024-01-10 NOTE — ANESTHESIA POSTPROCEDURE EVALUATION
Patient: Wally Alicia    Procedure Summary       Date: 01/09/24 Room / Location: [facility] MAIN OR 09 / [facility] MAIN OR    Anesthesia Start: 1347 Anesthesia Stop: 1805    Procedure: Anterior cervical decompression and disc arthroplasty at C5 - 6 and C6 - 7 with decompression of the spinal cord and nerves, plastic and metallic implants (Spine Cervical) Diagnosis: (Cervical spondylosis with radiculopathy)    Surgeons: Andrew Rodriguez MD Anesthesiologist: Luis Leonardo MD    Anesthesia Type: general ASA Status: 2            Anesthesia Type: general    Vitals Value Taken Time   /83 01/09/24 1824   Temp 97.2 °F (36.2 °C) 01/09/24 1804   Pulse 83 01/09/24 1830   Resp 13 01/09/24 1830   SpO2 97 % 01/09/24 1830   Vitals shown include unfiled device data.    [facility] AN Post Evaluation:   Patient Evaluated in PACU  Patient Participation: waiting for patient participation  Level of Consciousness: sleepy but conscious  Pain Score: 0  Pain Management: adequate  Airway Patency:patent  Yes    Cardiovascular Status: acceptable and hemodynamically stable  Respiratory Status: acceptable, spontaneous ventilation and nonlabored ventilation  Postoperative Hydration euvolemic      Luis Leonardo MD  1/9/2024 6:31 PM

## 2024-01-10 NOTE — DISCHARGE SUMMARY
[facility] Hospitalist Discharge Summary   Patient ID:  Wally Alicia  [id number]  43 year old  4/17/1980    Admit date: 1/9/2024  Discharge date: 1/10/2024  Primary Care Physician: SARAH MCCALL   Attending Physician: Kellie Covarrubias MD   Consults:   Consultants         Provider   Role Specialty     Yonatan Cerrato MD  Consulting Physician HOSPITALIST            Discharge Diagnoses:   <principal problem not specified>    Reason for admission  Copied from admission H&P: ***    Hospital Course:  ***    EXAM:   GENERAL: no apparent distress, comfortable  NEURO: A/A Ox3, no focal deficits  RESP: non labored, CTAB/L  CARDIO: Regular, no murmur  ABD: soft, NT, ND  EXTREMITIES: no edema, no calf tenderness    Operative Procedures: Procedure(s) (LRB):  Anterior cervical decompression and disc arthroplasty at C5 - 6 and C6 - 7 with decompression of the spinal cord and nerves, plastic and metallic implants (N/A)    Discharge Instructions     Medication List        START taking these medications      acetaminophen 500 MG Tabs  Commonly known as: Tylenol Extra Strength  Notes to patient: 6 am, 12 pm, 6 pm, 12 am     naproxen 500 MG Tabs  Commonly known as: Naprosyn            CONTINUE taking these medications      amphetamine-dextroamphetamine ER 10 MG Cp24  Commonly known as: Adderall XR     methocarbamol 750 MG Tabs  Commonly known as: Robaxin              Activity: {discharge activity:50299}  Diet: {diet:50988}  Wound Care: NA  Code Status: No Order        Discharge Instructions         May shower tonight, w/ no direct contact from water.  Pat dry after.  Tylenol 1000 mg Q6 for first few days then Q8 and finally AM & PM.  No BLT's- bending/lifting (over 10 lbs)/ twisting.  A gallon of milk is about 8lbs so let that be your guide.  Naproxen is prescribed as 2X/day @ meals.  For more even pain coverage you can take it with breakfast and then 12 hours later (but eat something with it to protect your stomach).  Walking is  key to prevent blood clots and constipation.  You can also do ankle pumps to prevent clots and get something like miralax/metamucil to further prevent constipation.  Prunes are a nice natural alternative.            Important follow up:   Follow-up Information       Andrew Rodriguez MD Follow up in 2 week(s).    Specialty: NEUROSURGERY  Contact information:  [address] [address]  [phone]                             -PCP in [] within 7 days [] within 14 days [] other     Disposition: {disposition:62106}  Discharged Condition: {condition:14849}    Hospital Discharge Diagnoses: {Enter hospital discharge diagnoses here (please select the wildcards and then replace with free text; this allows us to save this data discretely for reporting purposes:5961::\"***\"}    Lace+ Score: 15  59-90 High Risk  29-58 Medium Risk  0-28   Low Risk.    TCM Follow-Up Recommendation:  {Care Managers will evaluate the need for follow-up for all patients ages 50+, and high/moderate risk patients ages 25-49. Low risk patients (LACE < 29) will only be evaluated if the \"Still recommend for TCM follow-up\" option is selected from this list.:0632}            Total Time Coordinating Care: Greater than 30 minutes    Patient had opportunity to ask questions, state understanding, and agree with therapeutic plan as outlined    Kellie Covarrubias MD  Hospitalist  1/10/2024

## 2024-01-10 NOTE — CONSULTS
[facility]    PATIENT'S NAME: SANDY CHATMAN   ATTENDING PHYSICIAN: Andrew Rodriguez MD   CONSULTING PHYSICIAN: Yonatan Cerrato MD   PATIENT ACCOUNT#:   [id number]    LOCATION:  1E Room 2 A [facility]  MEDICAL RECORD #:   [id number]       YOB: 1980  ADMISSION DATE:       01/09/2024      CONSULT DATE:  01/09/2024    REPORT OF CONSULTATION      REASON FOR CONSULTATION:  Anterior cervical discectomy and fusion.    HISTORY OF PRESENT ILLNESS:  Patient is a 43-year-old  male with chronic neck pain and cervical radiculopathy, was evaluated by Spine Neurosurgery, Dr. Rodriguez, and considering patient's chronic condition and failure of conservative medical management option.  He was scheduled today for above-mentioned procedure.  Postoperatively, transferred to PACU for further monitoring.    PAST MEDICAL HISTORY:  Attention deficit disorder, cervical spondylosis, and cervical radiculopathy.    PAST SURGICAL HISTORY:  None.    MEDICATIONS:  Please see medication reconciliation list.      ALLERGIES:  No known drug allergies.    SOCIAL HISTORY:  No tobacco or drug use.  Occasional cannabis.  Usually independent in his basic activities of daily living.     FAMILY HISTORY:  Mother had breast cancer.    REVIEW OF SYSTEMS:  Currently resting in bed, some neck discomfort, but no upper extremity tingling or numbness.  Other 12-point review of systems is negative.      PHYSICAL EXAMINATION:    GENERAL:  Alert and oriented to time, place, and person.  Mild distress.  VITAL SIGNS:  Temperature 97.2, pulse 85, respiratory rate 18, blood pressure 134/82, pulse ox 95% on room air.    HEENT:  Atraumatic.  Oropharynx clear.  Dry mucous membranes.  Normal hard and soft palate.  Eyes:  Anicteric sclerae.    NECK:  Supple.  No lymphadenopathy.  Trachea midline.  Full range of motion.  Anterior Dermabond and surgical drain Hemovac.  LUNGS:  Clear to auscultation bilaterally.  Normal respiratory effort.    HEART:   Regular rate, rhythm.  S1 and S2 auscultated.  No murmur.   ABDOMEN:  Soft, nondistended.  No tenderness.  Positive bowel sounds.    EXTREMITIES:  No peripheral edema, clubbing, or cyanosis.  NEUROLOGIC:  Motor and sensory intact.      ASSESSMENT AND PLAN:  Degenerative joint disease of cervical spine, spondylosis with cervical radiculopathy, status post anterior cervical decompression and disc arthroplasty at C5-C6, C6-C7 with decompression of the spinal cord and nerves.  Plastic and metallic implants.  Pain control.  Neuro checks.  Monitor wound and surgical drain.  DVT prophylaxis.  Physical and occupational therapy.    Dictated By Yonatan Cerrato MD  d: 01/09/2024 18:27:46  t: 01/09/2024 19:31:11  Job [id number]  FB/

## (undated) DEVICE — 3M™ TEGADERM™ TRANSPARENT FILM DRESSING FRAME STYLE, 1624W, US-VER, 100/CARTON 4 CARTONS/CASE: Brand: 3M™ TEGADERM™

## (undated) DEVICE — PENCIL ES BTTN SWCH W/ TIP HOLSTER E-Z CLN

## (undated) DEVICE — EVACUATOR SUR 100CC SIL BLB WND

## (undated) DEVICE — BIOPATCH™ ANTIMICROBIAL DRESSING WITH CHLORHEXIDINE GLUCONATE IS A HYDROPHILLIC POLYURETHANE ABSORPTIVE FOAM WITH CHLORHEXIDINE GLUCONATE (CHG) WHICH INHIBITS BACTERIAL GROWTH UNDER THE DRESSING. THE DRESSING IS INTENDED TO BE USED TO ABSORB EXUDATE, COVER A WOUND CAUSED BY VASCULAR AND NONVASCULAR PERCUTANEOUS MEDICAL DEVICES DURING SURGERY, AS WELL AS REDUCE LOCAL INFECTION AND COLONIZATION OF MICROORGANISMS.: Brand: BIOPATCH

## (undated) DEVICE — 3.0MM NEURO (MATCH HEAD) SOFT TOUCH

## (undated) DEVICE — GAMMEX® PI HYBRID SIZE 7.5, STERILE POWDER-FREE SURGICAL GLOVE, POLYISOPRENE AND NEOPRENE BLEND: Brand: GAMMEX

## (undated) DEVICE — DRAPE SHEET LG

## (undated) DEVICE — TZ MEDICAL TITANIUM DISTRACTION PINS 14MM: Brand: TZ MEDICAL TITANIUM DISTRACTION PINS

## (undated) DEVICE — SOLUTION IRRIG 1000ML 0.9% NACL USP BTL

## (undated) DEVICE — SUTURE PERMAHAND SZ 0 L18IN NONABSORBABLE BLK

## (undated) DEVICE — SUTURE VCRL SZ 5-0 L18IN ABSRB UD L13MM P-3

## (undated) DEVICE — SPONGE GZ 4XL4IN 100% COT 12 PLY TYP VII WVN

## (undated) DEVICE — ADHESIVE SKIN TOP FOR WND CLSR DERMBND ADV

## (undated) DEVICE — MEDI-VAC NON-CONDUCTIVE SUCTION TUBING: Brand: CARDINAL HEALTH

## (undated) DEVICE — MICRO KOVER: Brand: UNBRANDED

## (undated) DEVICE — V. MUELLER FRAZIER SUCTION TUBE ANGLED, 8FR WORKING LENGTH 3-7/8 INCHES 9.8CM: Brand: V. MUELLER

## (undated) DEVICE — 3M™ STERI-DRAPE™ INCISE DRAPE 1050 (60CM X 45CM): Brand: STERI-DRAPE™

## (undated) DEVICE — MONITORING NEUROPHYSIOLOGICAL

## (undated) DEVICE — FRAZIER SUCTION INSTRUMENT 10 FR W/CONTROL VENT & OBTURATOR: Brand: FRAZIER

## (undated) DEVICE — CERVICAL CDS: Brand: MEDLINE INDUSTRIES, INC.

## (undated) DEVICE — ADHESIVE LIQ 2/3ML VI MASTISOL

## (undated) DEVICE — 4.0MM EGG

## (undated) DEVICE — ENCORE® LATEX MICRO SIZE 8, STERILE LATEX POWDER-FREE SURGICAL GLOVE: Brand: ENCORE

## (undated) DEVICE — FORCEP CUSH BAY BP DISP 7.5IN

## (undated) DEVICE — ENCORE® LATEX MICRO SIZE 7, STERILE LATEX POWDER-FREE SURGICAL GLOVE: Brand: ENCORE

## (undated) DEVICE — SUTURE VCRL SZ 4-0 L27IN ABSRB UD L17MM RB-1

## (undated) DEVICE — ELECTRODE ES L2.75IN XLN STD BLDE MOD E-Z CLN

## (undated) DEVICE — STANDARD POROUS TAPE: Brand: KENDALL

## (undated) DEVICE — 3M™ TEGADERM™ TRANSPARENT FILM DRESSING, 1626W, 4 IN X 4-3/4 IN (10 CM X 12 CM), 50 EACH/CARTON, 4 CARTON/CASE: Brand: 3M™ TEGADERM™

## (undated) DEVICE — DRAPE SHEET LAPAROTOMY

## (undated) DEVICE — DRAIN SILICONE RND 1/8

## (undated) DEVICE — GLV RAD SENSICARE BLACK 8.5

## (undated) DEVICE — ENCORE® LATEX MICRO SIZE 7.5, STERILE LATEX POWDER-FREE SURGICAL GLOVE: Brand: ENCORE

## (undated) DEVICE — SNAP KOVER: Brand: UNBRANDED

## (undated) NOTE — LETTER
Date: 11/13/2023    Patient Name: Shantell Hamilton          To Whom It May Concern: This letter has been written at the patient's request. The above patient was seen at the [facility] for treatment of a medical condition. Mr. Daryl Armas is scheduled for a procedure on January 8, 2024 and should be excused from attending work in the interim. Following the procedure, we will make further recommendations regarding his return to work. Please reach out to our office with any questions or concerns.      Sincerely,    Joann Tobin PA-C  Physician Assistant- Neurosurgery   [facility]  11/13/2023, 11:35 AM

## (undated) NOTE — LETTER
23  RE: Janak Villalobos     : 1980    Dear Dr. Jd Jalloh,    This letter is to inform you that your patient has been scheduled for surgery with Dr. Gerry Sevilla on 24 at [facility]. Pre-operative clearance has been requested. We have asked the patient to contact your office to schedule a pre-operative visit. Diagnosis: cervical disc herniation   Procedure: ANTERIOR CERVICAL 5 TO 6, CERVICAL 6 TO 7 DISCECTOMY AND FUSION      A Pre-operative History & Physical is needed for medical clearance within 30 days of surgery. Please address patient's active problems and potential risks of having surgery considering their medical history. Pre-op labs are scheduled through the [facility]. If any labs/testing are being done through the PCP office, then results should be faxed to the pre-admission testing department at [facility] at [phone]. Our pre-operative lab orders are located in our surgery order, if the patient would like these done through your office, you will need to place separate orders. Please fax clearance letter/office visit note to our office at fax #: [phone]. Your office note must clearly indicate if the patient is medically cleared for surgery or not. The following orders will be placed by pre-admission testing:  CBC  CMP  Type and Screen   PT/PTT/INR  MRSA/MSSA Nasal Swab  (*And any other pertinent testing based on patient's current clinical condition.)    If you have any questions, you may contact our office at [phone], option # 2.     Thank you,  No Benitez RN, BSN  Clinical Nurse [place]